# Patient Record
Sex: MALE | Race: BLACK OR AFRICAN AMERICAN | NOT HISPANIC OR LATINO | URBAN - METROPOLITAN AREA
[De-identification: names, ages, dates, MRNs, and addresses within clinical notes are randomized per-mention and may not be internally consistent; named-entity substitution may affect disease eponyms.]

---
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
## 2021-11-24 ENCOUNTER — OFFICE VISIT (OUTPATIENT)
Dept: URGENT CARE | Facility: CLINIC | Age: 21
End: 2021-11-24

## 2021-11-24 VITALS
OXYGEN SATURATION: 98 % | DIASTOLIC BLOOD PRESSURE: 86 MMHG | HEIGHT: 75 IN | WEIGHT: 315 LBS | HEART RATE: 83 BPM | TEMPERATURE: 97.3 F | BODY MASS INDEX: 39.17 KG/M2 | SYSTOLIC BLOOD PRESSURE: 138 MMHG | RESPIRATION RATE: 18 BRPM

## 2021-11-24 DIAGNOSIS — H93.11 TINNITUS OF RIGHT EAR: ICD-10-CM

## 2021-11-24 PROCEDURE — 99213 OFFICE O/P EST LOW 20 MIN: CPT | Performed by: NURSE PRACTITIONER

## 2021-11-24 RX ORDER — METHYLPREDNISOLONE 4 MG/1
TABLET ORAL
Qty: 21 TABLET | Refills: 0 | Status: SHIPPED | OUTPATIENT
Start: 2021-11-24

## 2021-11-24 NOTE — PROGRESS NOTES
Subjective     Waldo Krishna is a 21 y.o. male who presents with Earache (x1 week, ringing in Right ear )    No past medical history on file.     Social History     Socioeconomic History   • Marital status: Single     Spouse name: Not on file   • Number of children: Not on file   • Years of education: Not on file   • Highest education level: Not on file   Occupational History   • Not on file   Tobacco Use   • Smoking status: Never Smoker   • Smokeless tobacco: Never Used   Vaping Use   • Vaping Use: Never used   Substance and Sexual Activity   • Alcohol use: No   • Drug use: No   • Sexual activity: Not on file   Other Topics Concern   • Not on file   Social History Narrative   • Not on file     Social Determinants of Health     Financial Resource Strain:    • Difficulty of Paying Living Expenses: Not on file   Food Insecurity:    • Worried About Running Out of Food in the Last Year: Not on file   • Ran Out of Food in the Last Year: Not on file   Transportation Needs:    • Lack of Transportation (Medical): Not on file   • Lack of Transportation (Non-Medical): Not on file   Physical Activity:    • Days of Exercise per Week: Not on file   • Minutes of Exercise per Session: Not on file   Stress:    • Feeling of Stress : Not on file   Social Connections:    • Frequency of Communication with Friends and Family: Not on file   • Frequency of Social Gatherings with Friends and Family: Not on file   • Attends [employer] Services: Not on file   • Active Member of Clubs or Organizations: Not on file   • Attends Club or Organization Meetings: Not on file   • Marital Status: Not on file   Intimate Partner Violence:    • Fear of Current or Ex-Partner: Not on file   • Emotionally Abused: Not on file   • Physically Abused: Not on file   • Sexually Abused: Not on file   Housing Stability:    • Unable to Pay for Housing in the Last Year: Not on file   • Number of Places Lived in the Last Year: Not on file   • Unstable Housing in the  "Last Year: Not on file     No family history on file.    Allergies: Patient has no known allergies.    Patient is a 21-year-old male who presents today with complaint of tinnitus to the right ear.  Symptoms started over the last 2 to 3 days.  No fall or injury.  Patient states he was wearing headphones and when he took off the headphones he was having ringing in his right ear.  He denies playing loud music.  No other upper respiratory symptoms.          Other  This is a new problem. The current episode started in the past 7 days. The problem occurs constantly. The problem has been unchanged. Associated symptoms comments: Right ear tinnitus. Nothing aggravates the symptoms. He has tried nothing for the symptoms. The treatment provided no relief.       Review of Systems   HENT: Positive for tinnitus.    All other systems reviewed and are negative.             Objective     /86   Pulse 83   Temp 36.3 °C (97.3 °F) (Temporal)   Resp 18   Ht 1.905 m (6' 3\")   Wt (!) 197 kg (435 lb)   SpO2 98%   BMI 54.37 kg/m²      Physical Exam  Vitals reviewed.   Constitutional:       Appearance: Normal appearance.   HENT:      Right Ear: There is no impacted cerumen.      Left Ear: There is no impacted cerumen.      Ears:      Comments: No redness or erythema noted of the right tympanic membrane.  Few air bubbles noted.  No pre or postauricular tenderness.     Nose: Nose normal.      Mouth/Throat:      Mouth: Mucous membranes are moist.   Eyes:      Extraocular Movements: Extraocular movements intact.      Conjunctiva/sclera: Conjunctivae normal.      Pupils: Pupils are equal, round, and reactive to light.   Skin:     General: Skin is warm and dry.   Neurological:      General: No focal deficit present.      Mental Status: He is alert and oriented to person, place, and time.   Psychiatric:         Mood and Affect: Mood normal.         Behavior: Behavior normal.                             Assessment & Plan   Tinnitus, " right ear  Possible eustachian tube dysfunction    Flonase  Over-the-counter decongestant of choice  Medrol Dosepak  Referral given to ENT to use if needed  Follow-up with any further questions or concerns     There are no diagnoses linked to this encounter.

## 2024-11-13 ENCOUNTER — HOSPITAL ENCOUNTER (EMERGENCY)
Facility: MEDICAL CENTER | Age: 24
End: 2024-11-13
Attending: EMERGENCY MEDICINE
Payer: MEDICAID

## 2024-11-13 VITALS
BODY MASS INDEX: 38.36 KG/M2 | RESPIRATION RATE: 20 BRPM | HEART RATE: 90 BPM | HEIGHT: 76 IN | DIASTOLIC BLOOD PRESSURE: 88 MMHG | OXYGEN SATURATION: 95 % | SYSTOLIC BLOOD PRESSURE: 174 MMHG | WEIGHT: 315 LBS | TEMPERATURE: 97.4 F

## 2024-11-13 DIAGNOSIS — L02.11 CUTANEOUS ABSCESS OF NECK: ICD-10-CM

## 2024-11-13 PROCEDURE — A9270 NON-COVERED ITEM OR SERVICE: HCPCS | Performed by: EMERGENCY MEDICINE

## 2024-11-13 PROCEDURE — 99282 EMERGENCY DEPT VISIT SF MDM: CPT

## 2024-11-13 PROCEDURE — 700102 HCHG RX REV CODE 250 W/ 637 OVERRIDE(OP): Performed by: EMERGENCY MEDICINE

## 2024-11-13 RX ORDER — SULFAMETHOXAZOLE AND TRIMETHOPRIM 800; 160 MG/1; MG/1
1 TABLET ORAL 2 TIMES DAILY
Qty: 14 TABLET | Refills: 0 | Status: ACTIVE | OUTPATIENT
Start: 2024-11-13 | End: 2024-11-20

## 2024-11-13 RX ORDER — SULFAMETHOXAZOLE AND TRIMETHOPRIM 800; 160 MG/1; MG/1
1 TABLET ORAL ONCE
Status: COMPLETED | OUTPATIENT
Start: 2024-11-13 | End: 2024-11-13

## 2024-11-13 RX ADMIN — SULFAMETHOXAZOLE AND TRIMETHOPRIM 1 TABLET: 800; 160 TABLET ORAL at 03:15

## 2024-11-13 NOTE — ED TRIAGE NOTES
".  Chief Complaint   Patient presents with    Abscess     Patient states he has noticed a growth on the left side of his neck that has been growing for 2-3 days. Patient denies difficulty breathing, swallowing.      .BP (!) 177/88   Pulse 85   Temp 36.2 °C (97.2 °F) (Temporal)   Resp (!) 22   Ht 1.93 m (6' 4\")   Wt (!) 242 kg (533 lb 1.2 oz)   SpO2 93%   BMI 64.89 kg/m²     Patient is AAOX4. Breathing is even and unlabored.     Patient states he only feels pain when turning neck and touching location  "

## 2024-11-13 NOTE — ED NOTES
Pt walked into ER with mother with c/o neck pain (lump)   Pt stated that 3-4 days ago he noticed a lump on his lower left side of neck.   Doesn't hurt to eat or swallow but does hurt to the touch.

## 2024-11-13 NOTE — ED NOTES
Patient is AAOX4. Breathing is even and unlabored. Patient was read discharge instructions. Patient has no other questions or concerns at this time. Patient tolerated medication administration. Patient ambulated self unassisted to pov Waldo Krishna was discharged home ambulatory, gait upright, steady.

## 2024-11-13 NOTE — ED PROVIDER NOTES
"ED Provider Note    CHIEF COMPLAINT  Chief Complaint   Patient presents with    Abscess     Patient states he has noticed a growth on the left side of his neck that has been growing for 2-3 days. Patient denies difficulty breathing, swallowing.        EXTERNAL RECORDS REVIEWED  Outpatient Notes reviewed family medicine progress note dated November 24, 2021 by APN Hamman.  History of eustachian tube dysfunction and tinnitus on the right.    HPI/ROS  LIMITATION TO HISTORY   Select: : None  OUTSIDE HISTORIAN(S):  Parent relates family history of abscesses, mother has had several    Waldo Krishna is a 24 y.o. male who presents for evaluation of atraumatic redness and swelling to the base of his neck on the far left side.  No injury, patient does have a full beard and notes this may have started with an ingrown hair.  No fever, it is tender when he pushes on it, no difficulty or pain with swallowing.  No difficulty breathing.  No nausea, no vomiting.  No bleeding, no drainage from the lesion.    PAST MEDICAL HISTORY   No regular prescription medications    SURGICAL HISTORY  patient denies any surgical history    FAMILY HISTORY  Mother notes multiple incisions and drainage for abscesses    SOCIAL HISTORY  Social History     Tobacco Use    Smoking status: Never    Smokeless tobacco: Never   Vaping Use    Vaping status: Never Used   Substance and Sexual Activity    Alcohol use: No    Drug use: No    Sexual activity: Not on file       CURRENT MEDICATIONS  Home Medications       Reviewed by Marquis Alva R.N. (Registered Nurse) on 11/13/24 at 0230  Med List Status: Not Addressed     Medication Last Dose Status   methylPREDNISolone (MEDROL DOSEPAK) 4 MG Tablet Therapy Pack  Active                    ALLERGIES  No Known Allergies    PHYSICAL EXAM  VITAL SIGNS: BP (!) 174/88   Pulse 90   Temp 36.3 °C (97.4 °F) (Temporal)   Resp 20   Ht 1.93 m (6' 4\")   Wt (!) 242 kg (533 lb 1.2 oz)   SpO2 95%   BMI 64.89 kg/m²  "   General: Alert, no acute distress  Skin: Warm, dry, normal for ethnicity  Head: Normocephalic, atraumatic  Neck: Trachea midline, no tenderness to midline of C-spine.  He has a palpable superficial cutaneous abscess that demonstrates no fluctuance but rather erythema and induration 3 cm x 1.5 centimeters to the lateral inferior aspect of the soft tissues of the neck.  No cervical nor submandibular lymphadenopathy.  No crepitus.  Eye: Extraocular movements are intact, sclera are anicteric  ENMT: Oral mucosa moist  Cardiovascular: Normal peripheral perfusion  Respiratory: respirations are non-labored, chest rise equal  Musculoskeletal: No swelling, no deformity  Neurological: Alert and oriented to person, place, time, and situation  Lymphatics: No cervical nor submandibular lymphadenopathy  Psychiatric: Cooperative, appropriate mood & affect         RADIOLOGY/PROCEDURES   I have performed bedside focused point-of-care ultrasound that demonstrates indurated soft tissues but no evidence of a drainable abscess pocket      COURSE & MEDICAL DECISION MAKING    ASSESSMENT, COURSE AND PLAN  Care Narrative: Nontoxic afebrile well in appearance 24-year-old presents for evaluation of atraumatic redness and swelling at the base of his neck on the left.  History and exam as above.  Patient has a superficial cutaneous abscess on the exam.  There is no fluctuance, no crepitus, no surrounding lymphadenopathy.  Airway is fully intact, no difficulty with breathing or swallowing.  Otherwise well in appearance without fever or tachycardia or hypotension.  Bedside ultrasound demonstrates no evidence of any drainable pocket with regard to the abscess, as such no indication for incision and drainage.  Will thusly initiate antibiotics, given Bactrim here in the ED, recommend warm compress for home and follow-up with primary care.  Appropriate referral ordered from the ED.    ED OBS: No; Patient does not meet criteria for ED Observation.  "0236: Seen at bedside.  Performed bedside ultrasound which shows no indication for incision and drainage.  Have ordered Bactrim for his abscess and recommend warm compress at home.      Patient Vitals for the past 24 hrs:   BP Temp Temp src Pulse Resp SpO2 Height Weight   11/13/24 0248 (!) 174/88 36.3 °C (97.4 °F) Temporal 90 20 95 % -- --   11/13/24 0217 (!) 177/88 36.2 °C (97.2 °F) Temporal 85 (!) 22 93 % 1.93 m (6' 4\") (!) 242 kg (533 lb 1.2 oz)        ADDITIONAL PROBLEMS MANAGED  Cutaneous abscess, elevated blood-pressure reading without history of hypertension    DISPOSITION AND DISCUSSIONS  I have discussed management of the patient with the following physicians and S[facility]ARIO's:  NA    Discussion of management with other QHP or appropriate source(s): None     Escalation of care considered, and ultimately not performed:NA    Barriers to care at this time, including but not limited to: Patient does not have established PCP.     Decision tools and prescription drugs considered including, but not limited to:  Antibiotics initiated for abscess without indication for incision and drainage, 7-day course of Bactrim .    The patient will return for new or worsening symptoms and is stable at the time of discharge.    Patient has had high blood pressure while in the emergency department, felt likely secondary to medical condition. Counseled patient to monitor blood pressure at home and follow up with primary care physician.      DISPOSITION:  Patient will be discharged home in stable condition.    FOLLOW UP:  [facility]  [address] [facility] [address]  [phone]  Schedule an appointment as soon as possible for a visit         OUTPATIENT MEDICATIONS:  New Prescriptions    SULFAMETHOXAZOLE-TRIMETHOPRIM (BACTRIM DS) 800-160 MG TABLET    Take 1 Tablet by mouth 2 times a day for 7 days.          FINAL DIAGNOSIS  1. Cutaneous abscess of neck         Electronically signed by: Anthony Head, " M.D., 11/13/2024 2:36 AM

## 2024-12-23 ENCOUNTER — HOSPITAL ENCOUNTER (EMERGENCY)
Facility: MEDICAL CENTER | Age: 24
End: 2024-12-23
Attending: EMERGENCY MEDICINE
Payer: MEDICAID

## 2024-12-23 VITALS
BODY MASS INDEX: 38.36 KG/M2 | HEART RATE: 86 BPM | TEMPERATURE: 97.6 F | WEIGHT: 315 LBS | OXYGEN SATURATION: 95 % | RESPIRATION RATE: 19 BRPM | DIASTOLIC BLOOD PRESSURE: 93 MMHG | SYSTOLIC BLOOD PRESSURE: 151 MMHG | HEIGHT: 76 IN

## 2024-12-23 DIAGNOSIS — J06.9 UPPER RESPIRATORY TRACT INFECTION, UNSPECIFIED TYPE: ICD-10-CM

## 2024-12-23 DIAGNOSIS — U07.1 COVID: ICD-10-CM

## 2024-12-23 DIAGNOSIS — R04.0 EPISTAXIS: ICD-10-CM

## 2024-12-23 DIAGNOSIS — R05.9 COUGH, UNSPECIFIED TYPE: ICD-10-CM

## 2024-12-23 LAB
FLUAV RNA SPEC QL NAA+PROBE: NEGATIVE
FLUBV RNA SPEC QL NAA+PROBE: NEGATIVE
RSV RNA SPEC QL NAA+PROBE: NEGATIVE
S PYO DNA SPEC NAA+PROBE: NOT DETECTED
SARS-COV-2 RNA RESP QL NAA+PROBE: DETECTED
SPECIMEN SOURCE: ABNORMAL

## 2024-12-23 PROCEDURE — 87651 STREP A DNA AMP PROBE: CPT

## 2024-12-23 PROCEDURE — 0241U HCHG SARS-COV-2 COVID-19 NFCT DS RESP RNA 4 TRGT MIC: CPT

## 2024-12-23 PROCEDURE — 99283 EMERGENCY DEPT VISIT LOW MDM: CPT

## 2024-12-23 RX ORDER — DEXTROMETHORPHAN HYDROBROMIDE AND PROMETHAZINE HYDROCHLORIDE 15; 6.25 MG/5ML; MG/5ML
5 SYRUP ORAL EVERY 4 HOURS PRN
Qty: 120 ML | Refills: 0 | Status: SHIPPED | OUTPATIENT
Start: 2024-12-23

## 2024-12-23 RX ORDER — FLUTICASONE PROPIONATE 50 MCG
1 SPRAY, SUSPENSION (ML) NASAL DAILY
Qty: 16 G | Refills: 0 | Status: SHIPPED | OUTPATIENT
Start: 2024-12-23

## 2024-12-24 NOTE — ED TRIAGE NOTES
"Chief Complaint   Patient presents with    Flu Like Symptoms     Ongoing 2-3 days. Endorses sore throat, rhinorrhea, epistaxis, lightheaded, fatigue.      BP (!) 151/93   Pulse 86   Temp 36.4 °C (97.6 °F) (Temporal)   Resp 19   Ht 1.93 m (6' 4\")   Wt (!) 238 kg (525 lb 12.8 oz)   SpO2 95%   BMI 64.00 kg/m²     COV/STREP collected in triage.   "

## 2024-12-24 NOTE — DISCHARGE INSTRUCTIONS
Follow up with your primary care physician for re-evaluation of your blood pressure.      Unfortunately COVID infection can take anywhere from days to weeks to resolve.  The Emergency Department, you do not have evidence of hypoxia, or other overwhelming condition.  Is very common to have nosebleeds, slight coughing up of blood secondary to irritation of your lungs.  If you have profound uncontrolled bleeding from your nose or nonstop coughing of blood then return to the emergency department.  Please take Ibuprofen and Tylenol for pain control, I will be prescribing you with a medication called Phenergan DM.

## 2024-12-24 NOTE — ED PROVIDER NOTES
"ED Provider Note    CHIEF COMPLAINT  Chief Complaint   Patient presents with    Flu Like Symptoms     Ongoing 2-3 days. Endorses sore throat, rhinorrhea, epistaxis, lightheaded, fatigue.        HPI/ROS    OUTSIDE HISTORIAN(S):  Patient's mother is at bedside saying the patient is an increasing cough and has had blood from his nose with blowing his nose and would like medications for his sinuses as well as his cough.    Waldo Krishna is a 24 y.o. male who presents complaint of flulike symptoms for 3 days.  He is had sore throat, rhinorrhea, slight blood in his nasal discharge, lightheaded and fatigue.  States that he has had a subjective fever.  Denies hemoptysis he had his had a nonproductive cough.    PAST MEDICAL HISTORY       SURGICAL HISTORY  patient denies any surgical history    FAMILY HISTORY  History reviewed. No pertinent family history.    SOCIAL HISTORY  Social History     Tobacco Use    Smoking status: Never    Smokeless tobacco: Never   Vaping Use    Vaping status: Never Used   Substance and Sexual Activity    Alcohol use: No    Drug use: No    Sexual activity: Not on file       CURRENT MEDICATIONS  Home Medications       Reviewed by Rui Cooper R.N. (Registered Nurse) on 12/23/24 at 9353  Med List Status: Not Addressed     Medication Last Dose Status   methylPREDNISolone (MEDROL DOSEPAK) 4 MG Tablet Therapy Pack  Active                    ALLERGIES  No Known Allergies    PHYSICAL EXAM  VITAL SIGNS: BP (!) 151/93   Pulse 86   Temp 36.4 °C (97.6 °F) (Temporal)   Resp 19   Ht 1.93 m (6' 4\")   Wt (!) 238 kg (525 lb 12.8 oz)   SpO2 95%   BMI 64.00 kg/m²    BMI of 64  Nursing notes and vitals reviewed.  Constitutional: Well developed, Well nourished, No acute distress, Non-toxic appearance.   Eyes: PERRLA, EOMI, Conjunctiva normal, No discharge.   HENT: Normocephalic, Atraumatic, no facial edema no active epistaxis,  Cardiovascular: Normal heart rate, Normal rhythm, No murmurs, No rubs, No " gallops.   Thorax & Lungs: No respiratory distress, No rales, No rhonchi, No wheezing, No chest tenderness.    Skin: Warm, Dry, No erythema, No rash.   Extremities: No deformity, no edema, good range of motion range of motion upper lower extremes bilaterally  Neurologic: Alert & oriented x 3, no focal abnormalities noted, acting appropriately on examination  Psychiatric: Affect normal for clinical presentation.    EKG/LABS  Results for orders placed or performed during the hospital encounter of 12/23/24   CoV-2, Flu A/B, And RSV by PCR (Onyvax)    Collection Time: 12/23/24  6:03 PM    Specimen: Respirate   Result Value Ref Range    Influenza virus A RNA Negative Negative    Influenza virus B, PCR Negative Negative    RSV, PCR Negative Negative    SARS-CoV-2 by PCR DETECTED (AA)     SARS-CoV-2 Source NP Swab    Group A Strep by PCR    Collection Time: 12/23/24  6:03 PM    Specimen: Throat   Result Value Ref Range    Group A Strep by PCR Not Detected Not Detected       I have independently interpreted this EKG    COURSE & MEDICAL DECISION MAKING    ASSESSMENT, COURSE AND PLAN  Care Narrative:  This is a 24-year-old male presents with COVID.  Here in the emergency department, he has no [place] hypoxia, any respiratory distress.  The patient's mother is at bedside and she is extremely upset about the wait times and how people are coming being roomed before her her signed up.  She is asking for medications for the sinuses as well as the cough.  I informed her there is no significant antibiotic we can give at this point for viral condition she was a upset with this.  She states that she currently has COVID as well.  As for the cough, prescribed Phenergan DM and for the dry nasal mucosa and cracking possible slight bleeding with blowing his nose of given a steroid called Flonase.  Fortunately the patient left without discharge instructions.  I have sent the medications to the pharmacy that they gated.  The patient has no  significant acute medical condition requiring hospitalization, is not hypoxic, speaking in full sentences, has no evidence of respiratory stress.  The patient's blood pressure slight elevated vascular follow-up with his provider for his blood pressure recheck.      ADDITIONAL PROBLEMS MANAGED  Blood pressure, the patient discharge instructions follow the primary care physician for his blood pressure check.    DISPOSITION AND DISCUSSIONS    FINAL DIAGNOSIS  1. Upper respiratory tract infection, unspecified type    2. COVID    3. Epistaxis    4. Cough, unspecified type      DISPOSITION:  Patient will be discharged home in stable condition.    FOLLOW UP:  [facility], Emergency Dept  [address]  [place] [id number] [phone]    If symptoms worsen      OUTPATIENT MEDICATIONS:  New Prescriptions    FLUTICASONE (FLONASE ALLERGY RELIEF) 50 MCG/ACT NASAL SPRAY    Administer 1 Spray into affected nostril(S) every day.    PROMETHAZINE-DEXTROMETHORPHAN (PROMETHAZINE-DM) 6.25-15 MG/5ML SYRUP    Take 5 mL by mouth every four hours as needed for Cough.       Electronically signed by: Agapito Adan D.O., 12/23/2024 8:15 PM